# Patient Record
Sex: MALE | Race: WHITE | Employment: FULL TIME | URBAN - METROPOLITAN AREA
[De-identification: names, ages, dates, MRNs, and addresses within clinical notes are randomized per-mention and may not be internally consistent; named-entity substitution may affect disease eponyms.]

---

## 2018-07-18 ENCOUNTER — APPOINTMENT (OUTPATIENT)
Dept: GENERAL RADIOLOGY | Facility: CLINIC | Age: 28
End: 2018-07-18
Attending: EMERGENCY MEDICINE
Payer: COMMERCIAL

## 2018-07-18 ENCOUNTER — HOSPITAL ENCOUNTER (EMERGENCY)
Facility: CLINIC | Age: 28
End: 2018-07-18

## 2018-07-18 ENCOUNTER — HOSPITAL ENCOUNTER (EMERGENCY)
Facility: CLINIC | Age: 28
Discharge: HOME | End: 2018-07-18
Attending: EMERGENCY MEDICINE | Admitting: EMERGENCY MEDICINE
Payer: COMMERCIAL

## 2018-07-18 VITALS
RESPIRATION RATE: 16 BRPM | OXYGEN SATURATION: 96 % | SYSTOLIC BLOOD PRESSURE: 138 MMHG | DIASTOLIC BLOOD PRESSURE: 79 MMHG | HEART RATE: 79 BPM

## 2018-07-18 DIAGNOSIS — K08.89 SUBLUXATION OF TOOTH: ICD-10-CM

## 2018-07-18 PROCEDURE — 99283 EMERGENCY DEPT VISIT LOW MDM: CPT | Mod: 25 | Performed by: EMERGENCY MEDICINE

## 2018-07-18 PROCEDURE — 25000132 ZZH RX MED GY IP 250 OP 250 PS 637: Performed by: EMERGENCY MEDICINE

## 2018-07-18 PROCEDURE — 70355 PANORAMIC X-RAY OF JAWS: CPT

## 2018-07-18 PROCEDURE — D7270 ZZHC DENTAL TOOTH REIMPLANT/STABILIZE, ACCIDENTAL EVULSION/DISPLACE: HCPCS | Performed by: EMERGENCY MEDICINE

## 2018-07-18 PROCEDURE — 99284 EMERGENCY DEPT VISIT MOD MDM: CPT | Mod: Z6 | Performed by: EMERGENCY MEDICINE

## 2018-07-18 RX ORDER — OXYCODONE HYDROCHLORIDE 5 MG/1
5 TABLET ORAL ONCE
Status: COMPLETED | OUTPATIENT
Start: 2018-07-18 | End: 2018-07-18

## 2018-07-18 RX ORDER — CHLORHEXIDINE GLUCONATE ORAL RINSE 1.2 MG/ML
15 SOLUTION DENTAL 2 TIMES DAILY
Qty: 300 ML | Refills: 0 | Status: SHIPPED | OUTPATIENT
Start: 2018-07-18 | End: 2018-07-28

## 2018-07-18 RX ORDER — OXYCODONE HYDROCHLORIDE 5 MG/1
5 TABLET ORAL EVERY 6 HOURS PRN
Qty: 12 TABLET | Refills: 0 | Status: SHIPPED | OUTPATIENT
Start: 2018-07-18

## 2018-07-18 RX ADMIN — OXYCODONE HYDROCHLORIDE 5 MG: 5 TABLET ORAL at 06:48

## 2018-07-18 NOTE — ED AVS SNAPSHOT
[facility], Emergency Department    [address] [address]    Phone:  [phone]                                       Aaron Barnes   MRN: [id number]    Department:  [facility], Emergency Department   Date of Visit:  7/18/2018           Patient Information     Date Of Birth          1990        Your diagnoses for this visit were:     Subluxation of tooth        You were seen by Aaron Dudley MD.        Discharge Instructions           Dental clinic follow today or tomorrow.  ([phone]) for splint replacement    Return to the emergency department for any worsening redness, swelling, drainage, pain, fever or any other concerns as given or discussed.       24 Hour Appointment Hotline       To make an appointment at any [facility] clinic, call [phone] ([phone]). If you don't have a family doctor or clinic, we will help you find one. [facility] clinics are conveniently located to serve the needs of you and your family.             Review of your medicines      START taking        Dose / Directions Last dose taken    chlorhexidine 0.12 % solution   Commonly known as:  PERIDEX   Dose:  15 mL   Quantity:  300 mL        Swish and spit 15 mLs in mouth 2 times daily for 10 days   Refills:  0        oxyCODONE IR 5 MG tablet   Commonly known as:  ROXICODONE   Dose:  5 mg   Quantity:  12 tablet        Take 1 tablet (5 mg) by mouth every 6 hours as needed for pain   Refills:  0                Information about OPIOIDS     PRESCRIPTION OPIOIDS: WHAT YOU NEED TO KNOW   We gave you an opioid (narcotic) pain medicine. It is important to manage your pain, but opioids are not always the best choice. You should first try all the other options your care team gave you. Take this medicine for as short a time (and as few doses) as possible.     These medicines have risks:    DO NOT drive when on new or higher doses of pain medicine. These medicines can affect your alertness and reaction  times, and you could be arrested for driving under the influence (DUI). If you need to use opioids long-term, talk to your care team about driving.    DO NOT operate heave machinery    DO NOT do any other dangerous activities while taking these medicines.     DO NOT drink any alcohol while taking these medicines.      If the opioid prescribed includes acetaminophen, DO NOT take with any other medicines that contain acetaminophen. Read all labels carefully. Look for the word  acetaminophen  or  Tylenol.  Ask your pharmacist if you have questions or are unsure.    You can get addicted to pain medicines, especially if you have a history of addiction (chemical, alcohol or substance dependence). Talk to your care team about ways to reduce this risk.    Store your pills in a secure place, locked if possible. We will not replace any lost or stolen medicine. If you don t finish your medicine, please throw away (dispose) as directed by your pharmacist. The [place] [employer]ution [employer] has more information about safe disposal: https://www.pca.[facility].[place].us/living-green/managing-unwanted-medications.     All opioids tend to cause constipation. Drink plenty of water and eat foods that have a lot of fiber, such as fruits, vegetables, prune juice, apple juice and high-fiber cereal. Take a laxative (Miralax, milk of magnesia, Colace, Senna) if you don t move your bowels at least every other day.         Prescriptions were sent or printed at these locations (2 Prescriptions)                   Other Prescriptions                Printed at Department/Unit printer (2 of 2)         chlorhexidine (CHLORHEXIDINE) 0.12 % solution               oxyCODONE IR (ROXICODONE) 5 MG tablet                Procedures and tests performed during your visit     XR Orthopantomogram Teeth Full Mouth      Orders Needing Specimen Collection     None      Pending Results     Date and Time Order Name Status Description    7/18/2018 0506 XR  "Orthopantomogram Teeth Full Mouth Preliminary             Pending Culture Results     No orders found from 2018 to 2018.            Pending Results Instructions     If you had any lab results that were not finalized at the time of your Discharge, you can call the ED Lab Result RN at [phone]. You will be contacted by this team for any positive Lab results or changes in treatment. The nurses are available 7 days a week from 10A to 6:30P.  You can leave a message 24 hours per day and they will return your call.        Thank you for choosing [facility]       Thank you for choosing [facility] for your care. Our goal is always to provide you with excellent care. Hearing back from our patients is one way we can continue to improve our services. Please take a few minutes to complete the written survey that you may receive in the mail after you visit with us. Thank you!        [employer] Information     [employer] lets you send messages to your doctor, view your test results, renew your prescriptions, schedule appointments and more. To sign up, go to [email or web address] . Click on \"Log in\" on the left side of the screen, which will take you to the Welcome page. Then click on \"Sign up Now\" on the right side of the page.     You will be asked to enter the access code listed below, as well as some personal information. Please follow the directions to create your username and password.     Your access code is: [email or web address]  Expires: 10/16/2018  6:28 AM     Your access code will  in 90 days. If you need help or a new code, please call your [facility] clinic or [phone].        Care EveryWhere ID     This is your Care EveryWhere ID. This could be used by other organizations to access your [facility] medical records  [id number]        Equal Access to Services     FLORA GOODMAN : paradise Rios, moriah giron. So waadan " [phone].    ATENCIÓN: Si habla español, tiene a eldridge disposición servicios gratuitos de asistencia lingüística. Llame al [phone].    We comply with applicable federal civil rights laws and [place] laws. We do not discriminate on the basis of race, color, national origin, age, disability, sex, sexual orientation, or gender identity.            After Visit Summary       This is your record. Keep this with you and show to your community pharmacist(s) and doctor(s) at your next visit.

## 2018-07-18 NOTE — DISCHARGE INSTRUCTIONS
Dental clinic follow today or tomorrow.  ([phone]) for splint replacement    Return to the emergency department for any worsening redness, swelling, drainage, pain, fever or any other concerns as given or discussed.

## 2018-07-18 NOTE — ED PROVIDER NOTES
History     Chief Complaint   Patient presents with     Dental Injury     HPI  Aaron Barnes is a 28 year old male no significant past medical history, up-to-date on immunization and tetanus, presents with subluxation of teeth 9 and 10 to the hockey puck to face injury while playing hockey.  Initially seen at [facility] where his lip laceration was repaired and ultimately re-presented to our ED due to concern for his teeth.    Endorses ongoing pain, no active bleeding.  No other injuries including no head injury.  No similar injuries in the past.    I have reviewed the Medications, Allergies, Past Medical and Surgical History, and Social History in the Epic system.    Review of Systems   14 point review of symptoms was performed and is negative except as noted above.     Physical Exam          Physical Exam  GEN: Well appearing, non toxic, cooperative and conversant.   HEENT: Pupils are equal round and reactive to light. Extraocular motions are intact.  Lower lip status post repair of laceration involving the vermilion border.  Teeth 9 and 10 are subluxed and angulated posteriorly.    CV: Regular rate   PULM: Unlabored breathing     EXT: Full range of motion.  No edema.  NEURO: Cranial nerves II through XII are intact and symmetric. Bilateral upper and lower extremities grossly show full range of motion without any focal deficits.     PSYCH: Calm and cooperative, interactive.     ED Course     ED Course     Procedures        Panorex imaging confirms teeth subluxation without associated fracture.       Labs Ordered and Resulted from Time of ED Arrival Up to the Time of Departure from the ED - No data to display         Assessments & Plan (with Medical Decision Making)   Teeth subluxation following trauma as described  Appreciate dental consult who performed dental block, and reduced teeth, splint placed  We will follow up with dental clinic today or tomorrow for splint revision  -Oxycodone for  pain  Chlorhexidine rinse    - Patient agrees to our plan and is ready and eager for discharge. Care plan, follow up plan, and reasons to return immediately to the ED were dicussed in detail and summarized as noted in the discharge instructions.      I have reviewed the nursing notes.    I have reviewed the findings, diagnosis, plan and need for follow up with the patient.    New Prescriptions    CHLORHEXIDINE (CHLORHEXIDINE) 0.12 % SOLUTION    Swish and spit 15 mLs in mouth 2 times daily for 10 days    OXYCODONE IR (ROXICODONE) 5 MG TABLET    Take 1 tablet (5 mg) by mouth every 6 hours as needed for pain       Final diagnoses:   Subluxation of tooth       7/18/2018   [facility], [place], EMERGENCY DEPARTMENT     Aaron Dudley MD  07/18/18 0684

## 2018-07-18 NOTE — ED AVS SNAPSHOT
[facility], [place], Emergency Department    [address] [address]    Phone:  [phone]                                       Aaron Barnes   MRN: [id number]    Department:  [facility], Emergency Department   Date of Visit:  7/18/2018           After Visit Summary Signature Page     I have received my discharge instructions, and my questions have been answered. I have discussed any challenges I see with this plan with the nurse or doctor.    ..........................................................................................................................................  Patient/Patient Representative Signature      ..........................................................................................................................................  Patient Representative Print Name and Relationship to Patient    ..................................................               ................................................  Date                                            Time    ..........................................................................................................................................  Reviewed by Signature/Title    ...................................................              ..............................................  Date                                                            Time

## 2018-07-18 NOTE — CONSULTS
Dental Service Consultation        Aaron Barnes MRN# [id number]   YOB: 1990 Age: 28 year old   Date of Admission: (Not on file)     Reason for consult: I was asked by ED to evaluate this patient for Subluxed lower left anterior teeth.           Assessment and Plan:   Assessment:  Trauma related Subluxation of 23 and 24, concussion of 25 and 26, malocclusion due to trauma     Plan: Today: wire splint applied to 22, 23, 24, 25, 26 after reduction  Used 3 carps 2% lidocaine with 1: 100k epi, bilat mental nerve block, etch, bond and composite a2 light cured to Heladio wire. Teeth relatively in line     Future Plan:   1. Follow up with dentist or our clinic in next 48 hours: [phone]  2. Peridex 0.12 % 15 cc BID x 10 d  3. OTC ibuprofen or pain control as recommended by ED staff             Chief Complaint:   Teeth not in sockets after Hockey puck injury 7 pm last night    History is obtained from the patient         History of Present Illness:   This patient is a 28 year old male who presents to ED with dental injury to lower anterior teeth after hockey puck injury, teeth 23, 24 subluxed posteriorly and superiorly              Past Medical History:   No past medical history on file.          Past Surgical History:   No past surgical history on file.            Social History:     Social History   Substance Use Topics     Smoking status: Not on file     Smokeless tobacco: Not on file     Alcohol use Not on file             Family History:   No family history on file.          Immunizations:     There is no immunization history on file for this patient.          Allergies:   Allergies not on file          Medications:     No current Epic-ordered facility-administered medications on file.      Current Outpatient Prescriptions Ordered in Epic   Medication     chlorhexidine (CHLORHEXIDINE) 0.12 % solution     oxyCODONE IR (ROXICODONE) 5 MG tablet             Review of Systems:   The 10 point Review of  Systems is negative other than noted in the HPI            Physical Exam:   Vitals were reviewed: non remarkable                       Head and neck exam: lower left lip 2 cm laceration already sutured by ED staff    Intraoral exam: teeth 23, 24 subluxed out of dental socket, 25 and 26 are concussed and sensitive       Data:   Radiographic interpretation: Orthopantomogram taken on 7/18/18   Osseous pathology: Periapical radiolucency at apex of 23, 24, teeth superiorly out of alignment, no fractures found,   Pulpal Pathology: non remarkable  Periodontal Pathology: non remarkable  Caries: not assessed  Odontogenic pathology: teeth 23 and 24 displaced superiorly from tooth socket      PGY1: JÚNIOR Leon, DMD  Pager: [phone]